# Patient Record
Sex: FEMALE | Race: WHITE | NOT HISPANIC OR LATINO
[De-identification: names, ages, dates, MRNs, and addresses within clinical notes are randomized per-mention and may not be internally consistent; named-entity substitution may affect disease eponyms.]

---
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
## 2020-10-11 ENCOUNTER — APPOINTMENT (OUTPATIENT)
Dept: DISASTER EMERGENCY | Facility: CLINIC | Age: 20
End: 2020-10-11

## 2020-10-11 DIAGNOSIS — Z01.818 ENCOUNTER FOR OTHER PREPROCEDURAL EXAMINATION: ICD-10-CM

## 2020-10-12 LAB — SARS-COV-2 N GENE NPH QL NAA+PROBE: NOT DETECTED

## 2020-10-14 ENCOUNTER — OUTPATIENT (OUTPATIENT)
Dept: OUTPATIENT SERVICES | Facility: HOSPITAL | Age: 20
LOS: 1 days | End: 2020-10-14

## 2021-07-19 ENCOUNTER — OUTPATIENT (OUTPATIENT)
Dept: OUTPATIENT SERVICES | Facility: HOSPITAL | Age: 21
LOS: 1 days | End: 2021-07-19
Payer: MEDICAID

## 2021-07-19 PROCEDURE — 74177 CT ABD & PELVIS W/CONTRAST: CPT | Mod: 26

## 2021-08-13 ENCOUNTER — APPOINTMENT (OUTPATIENT)
Dept: OBGYN | Facility: CLINIC | Age: 21
End: 2021-08-13
Payer: MEDICAID

## 2021-08-13 ENCOUNTER — NON-APPOINTMENT (OUTPATIENT)
Age: 21
End: 2021-08-13

## 2021-08-13 VITALS
DIASTOLIC BLOOD PRESSURE: 60 MMHG | WEIGHT: 137 LBS | BODY MASS INDEX: 22.82 KG/M2 | HEIGHT: 65 IN | SYSTOLIC BLOOD PRESSURE: 100 MMHG

## 2021-08-13 DIAGNOSIS — Z80.1 FAMILY HISTORY OF MALIGNANT NEOPLASM OF TRACHEA, BRONCHUS AND LUNG: ICD-10-CM

## 2021-08-13 DIAGNOSIS — Z87.19 PERSONAL HISTORY OF OTHER DISEASES OF THE DIGESTIVE SYSTEM: ICD-10-CM

## 2021-08-13 DIAGNOSIS — Z87.42 PERSONAL HISTORY OF OTHER DISEASES OF THE FEMALE GENITAL TRACT: ICD-10-CM

## 2021-08-13 DIAGNOSIS — Z00.00 ENCOUNTER FOR GENERAL ADULT MEDICAL EXAMINATION W/OUT ABNORMAL FINDINGS: ICD-10-CM

## 2021-08-13 DIAGNOSIS — N64.4 MASTODYNIA: ICD-10-CM

## 2021-08-13 DIAGNOSIS — Z72.89 OTHER PROBLEMS RELATED TO LIFESTYLE: ICD-10-CM

## 2021-08-13 PROCEDURE — 99202 OFFICE O/P NEW SF 15 MIN: CPT | Mod: 25

## 2021-08-13 PROCEDURE — 81025 URINE PREGNANCY TEST: CPT

## 2021-08-13 PROCEDURE — 99385 PREV VISIT NEW AGE 18-39: CPT

## 2021-08-13 NOTE — PHYSICAL EXAM
[Appropriately responsive] : appropriately responsive [Alert] : alert [No Acute Distress] : no acute distress [No Lymphadenopathy] : no lymphadenopathy [Regular Rate Rhythm] : regular rate rhythm [No Murmurs] : no murmurs [Clear to Auscultation B/L] : clear to auscultation bilaterally [Soft] : soft [Non-tender] : non-tender [Non-distended] : non-distended [No HSM] : No HSM [No Lesions] : no lesions [No Mass] : no mass [Oriented x3] : oriented x3 [FreeTextEntry6] : Pt c/o R breast pain that occurs intermittent and may or may not be associated with her period  [Examination Of The Breasts] : a normal appearance [No Masses] : no breast masses were palpable [Labia Majora] : normal [Labia Minora] : normal [Moderate] : There was moderate vaginal bleeding [Normal] : normal [Uterine Adnexae] : normal

## 2021-08-13 NOTE — HISTORY OF PRESENT ILLNESS
[FreeTextEntry1] : 21yo G0 LMP 8/13 (first starting bleeding 7/31 and then it resumed 8/6 and stopped and then resumed on 8/13) here for GYN intake and to discuss recent finding of ovarian cyst and irreg bleeding. Pt had evaluation of RLQ pain and she was told she had a "small cyst". She was told to follow up with GYN. \par \par Additionally, she reports that her period has been irregular this month and previously a few months ago. She normal bleeds for 4-5 days and then stops this months she has had on and off bleeding and is currenlty bleeding now. She is not on any medications, denies sexual activities, no health changes. Her COVID vaccine was completed in May 2021.  [Mammogramdate] : N/A [BreastSonogramDate] : N/A [BoneDensityDate] : N/A [ColonoscopyDate] : 2021 - - -

## 2021-08-13 NOTE — COUNSELING
[Nutrition/ Exercise/ Weight Management] : nutrition, exercise, weight management [Sunscreen] : sunscreen [Vitamins/Supplements] : vitamins/supplements [Drugs/Alcohol] : drugs, alcohol [Contraception/ Emergency Contraception/ Safe Sexual Practices] : contraception, emergency contraception, safe sexual practices [Intimate Partner Violence] : intimate partner violence [STD (testing, results, tx)] : STD (testing, results, tx) [Vaccines] : vaccines

## 2021-08-13 NOTE — DISCUSSION/SUMMARY
[FreeTextEntry1] : 19yo G0 LMP 8/13 with irreg bleeding this month and neg UCG \par \par Irreg bleeding:\par - RTO for TVUS and MD visit \par - TSH sent today \par - Pt to continue to track bleeding \par \par RHM: \par - DVS neg x 3\par - Dentist, PCP, Derm as discussed \par - Offered STI screen today. Pt declined \par - Encouraged healthy diet, exercise, weight maint. \par \par To Do:\par - Offer HPV vaccine at next visit \par \par Syl Rivera MD \par Obstetrician/Gynecologist\par

## 2021-08-20 LAB
C TRACH RRNA SPEC QL NAA+PROBE: NOT DETECTED
CYTOLOGY CVX/VAG DOC THIN PREP: NORMAL
HCG UR QL: NEGATIVE
N GONORRHOEA RRNA SPEC QL NAA+PROBE: NOT DETECTED
QUALITY CONTROL: YES
SOURCE TP AMPLIFICATION: NORMAL
TSH SERPL-ACNC: 1.24 UIU/ML

## 2021-09-15 ENCOUNTER — APPOINTMENT (OUTPATIENT)
Dept: OBGYN | Facility: CLINIC | Age: 21
End: 2021-09-15

## 2021-12-25 ENCOUNTER — TRANSCRIPTION ENCOUNTER (OUTPATIENT)
Age: 21
End: 2021-12-25

## 2022-01-23 ENCOUNTER — TRANSCRIPTION ENCOUNTER (OUTPATIENT)
Age: 22
End: 2022-01-23

## 2023-07-05 ENCOUNTER — TRANSCRIPTION ENCOUNTER (OUTPATIENT)
Age: 23
End: 2023-07-05

## 2023-07-05 ENCOUNTER — APPOINTMENT (OUTPATIENT)
Dept: ULTRASOUND IMAGING | Facility: CLINIC | Age: 23
End: 2023-07-05
Payer: MEDICAID

## 2023-07-05 PROCEDURE — 76700 US EXAM ABDOM COMPLETE: CPT

## 2023-07-24 ENCOUNTER — OFFICE (OUTPATIENT)
Dept: URBAN - METROPOLITAN AREA CLINIC 100 | Facility: CLINIC | Age: 23
Setting detail: OPHTHALMOLOGY
End: 2023-07-24
Payer: MEDICAID

## 2023-07-24 DIAGNOSIS — G24.5: ICD-10-CM

## 2023-07-24 DIAGNOSIS — H52.7: ICD-10-CM

## 2023-07-24 PROCEDURE — 92014 COMPRE OPH EXAM EST PT 1/>: CPT | Performed by: OPHTHALMOLOGY

## 2023-07-24 PROCEDURE — 92015 DETERMINE REFRACTIVE STATE: CPT | Performed by: OPHTHALMOLOGY

## 2023-07-24 ASSESSMENT — AXIALLENGTH_DERIVED
OS_AL: 24.9159
OD_AL: 25.3116
OS_AL: 25.2461
OD_AL: 25.3116
OS_AL: 24.9159
OD_AL: 25.4242

## 2023-07-24 ASSESSMENT — REFRACTION_CURRENTRX
OS_CYLINDER: -0.25
OD_SPHERE: -0.75
OS_VPRISM_DIRECTION: SV
OS_SPHERE: -0.50
OD_OVR_VA: 20/
OD_AXIS: 006
OS_OVR_VA: 20/
OD_CYLINDER: -0.50
OS_AXIS: 156
OD_VPRISM_DIRECTION: SV

## 2023-07-24 ASSESSMENT — KERATOMETRY
OD_K1POWER_DIOPTERS: 40.75
OD_AXISANGLE_DEGREES: 101
OD_K2POWER_DIOPTERS: 41.50
OS_K1POWER_DIOPTERS: 40.50
METHOD_AUTO_MANUAL: AUTO
OS_K2POWER_DIOPTERS: 41.00
OS_AXISANGLE_DEGREES: 075

## 2023-07-24 ASSESSMENT — TONOMETRY
OS_IOP_MMHG: 14
OD_IOP_MMHG: 13

## 2023-07-24 ASSESSMENT — REFRACTION_MANIFEST
OS_CYLINDER: -0.25
OD_SPHERE: -1.75
OS_VA1: 20/20
OS_SPHERE: -0.50
OS_VA1: 20/20
OD_SPHERE: -1.50
OS_SPHERE: -0.50
OD_AXIS: 005
OD_CYLINDER: -0.75
OD_VA1: 20/20
OD_CYLINDER: -0.75
OS_AXIS: 155
OD_AXIS: 005
OU_VA: 20/20
OD_VA1: 20/20
OS_CYLINDER: -0.25
OS_AXIS: 155

## 2023-07-24 ASSESSMENT — REFRACTION_AUTOREFRACTION
OD_CYLINDER: -0.75
OS_AXIS: 154
OD_SPHERE: -1.50
OD_AXIS: 005
OS_SPHERE: -1.25
OS_CYLINDER: -0.25

## 2023-07-24 ASSESSMENT — CONFRONTATIONAL VISUAL FIELD TEST (CVF)
OD_FINDINGS: FULL
OS_FINDINGS: FULL

## 2023-07-24 ASSESSMENT — SPHEQUIV_DERIVED
OD_SPHEQUIV: -1.875
OS_SPHEQUIV: -0.625
OD_SPHEQUIV: -2.125
OD_SPHEQUIV: -1.875
OS_SPHEQUIV: -0.625
OS_SPHEQUIV: -1.375

## 2023-07-24 ASSESSMENT — VISUAL ACUITY
OD_BCVA: 20/20
OS_BCVA: 20/20-2

## 2023-08-04 ENCOUNTER — RESULT REVIEW (OUTPATIENT)
Age: 23
End: 2023-08-04

## 2023-08-04 ENCOUNTER — APPOINTMENT (OUTPATIENT)
Dept: OBGYN | Facility: CLINIC | Age: 23
End: 2023-08-04
Payer: MEDICAID

## 2023-08-04 VITALS
WEIGHT: 135 LBS | HEIGHT: 65 IN | SYSTOLIC BLOOD PRESSURE: 113 MMHG | DIASTOLIC BLOOD PRESSURE: 80 MMHG | BODY MASS INDEX: 22.49 KG/M2

## 2023-08-04 PROCEDURE — 99212 OFFICE O/P EST SF 10 MIN: CPT | Mod: 25

## 2023-08-04 PROCEDURE — 99395 PREV VISIT EST AGE 18-39: CPT

## 2023-08-04 NOTE — DISCUSSION/SUMMARY
[FreeTextEntry1] : 23yo G0 LMP 7/26 not on BCM with slightly irreg cycles (pt says they are not entirely predictable but she is getting them monthly) and R adnexal lesion found in 2021.   Irreg cycles: Pt not currently sexually active therefore declines BCM  - Continue to monitor cycles and if lengthening or absent all together RTO to follow up   Breast fullness:  - Rx to be emailed to patient for b/l breast sono   R adnexal lesion:  - TVUS and MD visit to evaluate and discuss   RHM:  - DVS neg x 3 - Dentist, PCP, Derm as discussed  - Offered HPV vacc today. Pt given info to HPV vaccine. Will RTO to start series ASAP  - Offered STI screen today. Pt declined. GC/CT/Trich off pap  - Encouraged healthy diet, exercise, weight maint.   Syl Bruner MD  Obstetrician/Gynecologist

## 2023-08-04 NOTE — HISTORY OF PRESENT ILLNESS
[FreeTextEntry1] : 21yo G0 LMP 7/26 not on BCM is here for annual and to discuss menstrual irreg. Period is less predictable for the last 3 months. Last cycle length was 19 days. No changes to health or weight.   Of note, pt has a  4 x 4 x 4 R unilocular structure that was found on CT scan in 2021 that she did not return to office for sono to follow up.  [PapSmeardate] : 2021

## 2023-08-04 NOTE — PHYSICAL EXAM
[Appropriately responsive] : appropriately responsive [Alert] : alert [No Acute Distress] : no acute distress [No Lymphadenopathy] : no lymphadenopathy [Regular Rate Rhythm] : regular rate rhythm [No Murmurs] : no murmurs [Clear to Auscultation B/L] : clear to auscultation bilaterally [Soft] : soft [Non-tender] : non-tender [Non-distended] : non-distended [No HSM] : No HSM [No Lesions] : no lesions [No Mass] : no mass [Oriented x3] : oriented x3 [The Right Breast Was Examined] : a normal appearance [Diffuse Fibrous Tissue In The Left Breast] : fibrocystic changes [No Masses] : no breast masses were palpable [Labia Majora] : normal [Labia Minora] : normal [Normal] : normal [Uterine Adnexae] : normal [FreeTextEntry7] : fullness in L upper outer breast

## 2023-08-09 ENCOUNTER — APPOINTMENT (OUTPATIENT)
Dept: ANTEPARTUM | Facility: CLINIC | Age: 23
End: 2023-08-09
Payer: MEDICAID

## 2023-08-09 ENCOUNTER — ASOB RESULT (OUTPATIENT)
Age: 23
End: 2023-08-09

## 2023-08-09 LAB
C TRACH RRNA SPEC QL NAA+PROBE: NOT DETECTED
N GONORRHOEA RRNA SPEC QL NAA+PROBE: NOT DETECTED
SOURCE AMPLIFICATION: NORMAL
SOURCE TP AMPLIFICATION: NORMAL
T VAGINALIS RRNA SPEC QL NAA+PROBE: NOT DETECTED

## 2023-08-09 PROCEDURE — 76830 TRANSVAGINAL US NON-OB: CPT

## 2023-08-09 PROCEDURE — 76856 US EXAM PELVIC COMPLETE: CPT | Mod: 59

## 2023-08-10 ENCOUNTER — APPOINTMENT (OUTPATIENT)
Dept: OBGYN | Facility: CLINIC | Age: 23
End: 2023-08-10

## 2023-08-16 LAB — CYTOLOGY CVX/VAG DOC THIN PREP: NORMAL

## 2023-08-18 ENCOUNTER — APPOINTMENT (OUTPATIENT)
Dept: OBGYN | Facility: CLINIC | Age: 23
End: 2023-08-18
Payer: MEDICAID

## 2023-08-18 PROCEDURE — 99213 OFFICE O/P EST LOW 20 MIN: CPT

## 2023-08-20 NOTE — DISCUSSION/SUMMARY
[FreeTextEntry1] : 21yo G0 LMP 7/26 not on BCM with simple 3.0 L cyst.  Lesion seen on CT scan 2 yrs ago not seen on this imaging.   Irreg cycles:  Pt declines hormonal BCM for irreg cycles.  Pt to continue to track menses  If no period for 3 months, pt to call    L ov cyst:  Repeat TVUS and MD visit in 2 months  Precautions for torsion and rupture reviewed.   Routine care:  Will call to make appt for HPV vaccine in [facility] or [place] ASAP   Pt verbalized understanding  All questions were solicited and answered  Syl Bruner MD  Obstetrician/Gynecologist

## 2023-08-20 NOTE — HISTORY OF PRESENT ILLNESS
[FreeTextEntry1] : 21yo G0 LMP 7/26 not on BCM is being followed up.  She has a normal sized uterus, a normal EMS, normal R ov, 3cm simple L ov cyst. Her pap and STI screening is wnl.

## 2023-08-22 ENCOUNTER — RESULT REVIEW (OUTPATIENT)
Age: 23
End: 2023-08-22

## 2023-08-22 ENCOUNTER — APPOINTMENT (OUTPATIENT)
Dept: ULTRASOUND IMAGING | Facility: CLINIC | Age: 23
End: 2023-08-22
Payer: MEDICAID

## 2023-08-22 PROCEDURE — 76642 ULTRASOUND BREAST LIMITED: CPT | Mod: RT

## 2023-10-24 ENCOUNTER — APPOINTMENT (OUTPATIENT)
Dept: ANTEPARTUM | Facility: CLINIC | Age: 23
End: 2023-10-24
Payer: MEDICAID

## 2023-10-24 ENCOUNTER — ASOB RESULT (OUTPATIENT)
Age: 23
End: 2023-10-24

## 2023-10-24 PROCEDURE — 76857 US EXAM PELVIC LIMITED: CPT | Mod: 59

## 2023-10-24 PROCEDURE — 76830 TRANSVAGINAL US NON-OB: CPT

## 2023-10-25 ENCOUNTER — APPOINTMENT (OUTPATIENT)
Dept: OBGYN | Facility: CLINIC | Age: 23
End: 2023-10-25
Payer: MEDICAID

## 2023-10-25 VITALS
BODY MASS INDEX: 22.49 KG/M2 | SYSTOLIC BLOOD PRESSURE: 101 MMHG | DIASTOLIC BLOOD PRESSURE: 74 MMHG | WEIGHT: 135 LBS | HEIGHT: 65 IN

## 2023-10-25 PROCEDURE — 99212 OFFICE O/P EST SF 10 MIN: CPT

## 2024-05-02 ENCOUNTER — APPOINTMENT (OUTPATIENT)
Dept: ULTRASOUND IMAGING | Facility: CLINIC | Age: 24
End: 2024-05-02
Payer: MEDICAID

## 2024-05-02 ENCOUNTER — APPOINTMENT (OUTPATIENT)
Dept: MRI IMAGING | Facility: CLINIC | Age: 24
End: 2024-05-02
Payer: MEDICAID

## 2024-05-02 PROCEDURE — A9585: CPT

## 2024-05-02 PROCEDURE — 70544 MR ANGIOGRAPHY HEAD W/O DYE: CPT | Mod: 59

## 2024-05-02 PROCEDURE — 70553 MRI BRAIN STEM W/O & W/DYE: CPT

## 2024-05-02 PROCEDURE — 76536 US EXAM OF HEAD AND NECK: CPT

## 2024-08-07 ENCOUNTER — APPOINTMENT (OUTPATIENT)
Dept: OBGYN | Facility: CLINIC | Age: 24
End: 2024-08-07